# Patient Record
Sex: MALE | Race: WHITE | NOT HISPANIC OR LATINO | Employment: FULL TIME | URBAN - METROPOLITAN AREA
[De-identification: names, ages, dates, MRNs, and addresses within clinical notes are randomized per-mention and may not be internally consistent; named-entity substitution may affect disease eponyms.]

---

## 2023-10-07 ENCOUNTER — HOSPITAL ENCOUNTER (EMERGENCY)
Facility: CLINIC | Age: 28
Discharge: HOME | End: 2023-10-07
Attending: STUDENT IN AN ORGANIZED HEALTH CARE EDUCATION/TRAINING PROGRAM | Admitting: STUDENT IN AN ORGANIZED HEALTH CARE EDUCATION/TRAINING PROGRAM
Payer: COMMERCIAL

## 2023-10-07 VITALS
RESPIRATION RATE: 18 BRPM | SYSTOLIC BLOOD PRESSURE: 141 MMHG | TEMPERATURE: 99.4 F | HEART RATE: 104 BPM | DIASTOLIC BLOOD PRESSURE: 82 MMHG | OXYGEN SATURATION: 96 %

## 2023-10-07 DIAGNOSIS — S01.01XA LACERATION OF SCALP WITHOUT FOREIGN BODY, INITIAL ENCOUNTER: ICD-10-CM

## 2023-10-07 PROCEDURE — 12001 RPR S/N/AX/GEN/TRNK 2.5CM/<: CPT

## 2023-10-07 PROCEDURE — 250N000009 HC RX 250: Performed by: STUDENT IN AN ORGANIZED HEALTH CARE EDUCATION/TRAINING PROGRAM

## 2023-10-07 PROCEDURE — 99283 EMERGENCY DEPT VISIT LOW MDM: CPT

## 2023-10-07 RX ADMIN — Medication 3 ML: at 20:59

## 2023-10-07 ASSESSMENT — ACTIVITIES OF DAILY LIVING (ADL): ADLS_ACUITY_SCORE: 35

## 2023-10-08 NOTE — ED PROVIDER NOTES
History     Chief Complaint:  Head Laceration       HPI   Charli Sanchez is a 28 year old male who presents to the emergency department for evaluation of a head laceration. Patient reports that he was roughhousing with the dogs under a table and cut his head on exposed metal. He is not on blood thinners. Last tetanus in 2018. Patient denies loss of consciousness and vomiting.     Independent Historian:   None - Patient Only    Review of External Notes:   NA     Medications:    The patient is currently on no regular medications.    Past Medical History:    Tinea versicolor  Left wrist fracture  C. 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  Notable for initial tachycardia, which resolved on recheck.  General: Alert and oriented, in mild distress. Resting on bed.  Skin: Warm and well-perfused. No rashes, lesions, or erythema.   HEENT:   Head: No raccoon eyes or torres sign. 2 cm laceration to the left temporoparietal region of the scalp. Facial features symmetric.   Eyes: Conjunctiva pink, sclera white. EOMs grossly intact.   Ears: Auricles without lesion, erythema, or edema.  TMs visualized bilaterally with no hemotympanum.  Nose: Symmetric with no discharge.  Mouth and throat: Lips are moist with no chapping, lesions, or edema, Buccal mucosa is pink and moist without lesions. Oropharyngeal mucosa is pink and moist with no erythema, edema, or exudate. Uvula is midline.  Neck: Supple with no lymphadenopathy. Full ROM.   Pulmonary: Chest wall expansion symmetric with no increased work of breathing. Lungs clear to auscultation  bilaterally.   Cardiovascular: Heart RRR with no murmurs. 2+ radial and tibialis posterior pulses bilaterally. No peripheral edema.  Abdominal: No hernias, ecchymosis, or distension. Bowel sounds present and physiologic. Abdomen is soft and nontender to light and deep palpation in all 4 quadrants with no guarding or rebound. No masses or organomegaly.   Musculoskeletal: Moves all extremities spontaneously.  No midline spinal tenderness.  Neuro: Patient is alert and oriented to person place time.  Speech fluent with normal cognition.  Cranial nerves II through XII intact:   PERRL, EOMI, symmetric smile, equal eye squeeze and forehead raise, normal sensation in the V1 V2 V3 distribution, grossly equal hearing, midline tongue protrusion with normal side to side movement, full strength with head turn, normal shoulder shrug.  Steady gait.  5/5 strength with  and with flexion/extension at the shoulder, elbow, and wrist bilaterally.  Psych: Affect appropriate.  Answers questions appropriately. Patient appears calm.      Emergency Department Course     Procedures     Laceration Repair      Procedure: Laceration Repair    Indication: Laceration    Consent: Verbal    Location: Left Scalp     Length: 2 cm    Preparation: Irrigation with Sterile Saline.    Anesthesia/Sedation: Topical -LET and lidocaine 1%      Treatment/Exploration: Wound explored, no foreign bodies found     Closure: The wound was closed with  6 staples.    Patient Status: The patient tolerated the procedure well: Yes. There were no complications.      Emergency Department Course & Assessments:         Interventions:  Medications   lido-EPINEPHrine-tetracaine (LET) topical gel GEL (3 mLs Topical $Given 10/7/23 2059)        Independent Interpretation (X-rays, CTs, rhythm strip):  None    Consultations/Discussion of Management or Tests:     ED Course as of 10/07/23 2305   Sat Oct 07, 2023   2100 I obtained history and examined the patient as noted above.      2143 I performed the laceration repair.   2206 The patient is comfortable with plan for discharge.         Social Determinants of Health affecting care:   None    Disposition:  The patient was discharged to home.     Impression & Plan      Medical Decision Making:  Charli Sanchez is a 28 year old male who presents for evaluation of a laceration to the scalp.  [place] head CT rule, head CT is not indicated.  I likewise doubt concussion, as patient is asymptomatic at this time.  The wound was carefully evaluated and explored.  The laceration was closed with 6 staples as noted above.  There is no evidence of muscular, tendon, or bony damage with this laceration.  No signs of foreign body.  Possible complications (infection, scarring) were reviewed with the patient.  Follow up with primary care for staple removal in 10-14 days as noted in the discharge section.    Diagnosis:    ICD-10-CM    1. Laceration of scalp without foreign body, initial encounter  S01.01XA              Scribe Disclosure:  Rema MICHAELS, am serving as a scribe at 9:00 PM on 10/7/2023 to document services personally performed by Jerilyn Pineda PA-C based on my observations and the provider's statements to me.   10/7/2023   Jerilyn Pineda PA-C Sells, Jenna, PA-C  10/07/23 3012

## 2023-10-08 NOTE — ED TRIAGE NOTES
Pt arrives with head lac to L side following hitting metal part of table at home around 1930. Pain 4/10. Bleeding controlled.      Triage Assessment       Row Name 10/07/23 2045       Triage Assessment (Adult)    Airway WDL WDL       Respiratory WDL    Respiratory WDL WDL       Skin Circulation/Temperature WDL    Skin Circulation/Temperature WDL WDL       Cardiac WDL    Cardiac WDL WDL       Peripheral/Neurovascular WDL    Peripheral Neurovascular WDL WDL       Cognitive/Neuro/Behavioral WDL    Cognitive/Neuro/Behavioral WDL WDL